# Patient Record
Sex: FEMALE | Race: WHITE | NOT HISPANIC OR LATINO | Employment: OTHER | URBAN - METROPOLITAN AREA
[De-identification: names, ages, dates, MRNs, and addresses within clinical notes are randomized per-mention and may not be internally consistent; named-entity substitution may affect disease eponyms.]

---

## 2023-03-30 ENCOUNTER — TELEPHONE (OUTPATIENT)
Dept: PRIMARY CARE | Facility: CLINIC | Age: 73
End: 2023-03-30
Payer: MEDICARE

## 2023-03-30 DIAGNOSIS — E78.2 HYPERCHOLESTEROLEMIA WITH HYPERTRIGLYCERIDEMIA: ICD-10-CM

## 2023-03-30 DIAGNOSIS — M79.7 PRIMARY FIBROMYALGIA: Primary | ICD-10-CM

## 2023-03-30 DIAGNOSIS — F51.04 CHRONIC INSOMNIA: ICD-10-CM

## 2023-04-03 NOTE — TELEPHONE ENCOUNTER
Pt is wanting a paper script for Atorvastatin 10mg and Nortriptyline 50mg as she is struggling to get [facility] in [place] on [address] will not escribe and pt is frustrated.

## 2023-04-04 PROBLEM — G43.909 MIGRAINE: Status: ACTIVE | Noted: 2023-04-04

## 2023-04-04 PROBLEM — M79.7 PRIMARY FIBROMYALGIA: Status: ACTIVE | Noted: 2023-04-04

## 2023-04-04 PROBLEM — E78.2 HYPERCHOLESTEROLEMIA WITH HYPERTRIGLYCERIDEMIA: Status: ACTIVE | Noted: 2023-04-04

## 2023-04-04 PROBLEM — F51.04 CHRONIC INSOMNIA: Status: ACTIVE | Noted: 2023-04-04

## 2023-04-04 RX ORDER — ATORVASTATIN CALCIUM 10 MG/1
1 TABLET, FILM COATED ORAL DAILY
COMMUNITY
End: 2023-04-04 | Stop reason: SDUPTHER

## 2023-04-04 RX ORDER — NORTRIPTYLINE HYDROCHLORIDE 50 MG/1
50 CAPSULE ORAL NIGHTLY
COMMUNITY
Start: 2020-10-12 | End: 2023-04-04 | Stop reason: SDUPTHER

## 2023-04-04 RX ORDER — ATORVASTATIN CALCIUM 10 MG/1
10 TABLET, FILM COATED ORAL DAILY
Qty: 90 TABLET | Refills: 3 | Status: SHIPPED | OUTPATIENT
Start: 2023-04-04 | End: 2024-05-17

## 2023-04-04 RX ORDER — NORTRIPTYLINE HYDROCHLORIDE 50 MG/1
50 CAPSULE ORAL NIGHTLY
Qty: 90 CAPSULE | Refills: 3 | Status: SHIPPED | OUTPATIENT
Start: 2023-04-04 | End: 2024-04-03

## 2023-04-20 DIAGNOSIS — F41.1 GENERALIZED ANXIETY DISORDER: Primary | ICD-10-CM

## 2023-04-20 RX ORDER — HYOSCYAMINE SULFATE 0.12 MG/1
TABLET SUBLINGUAL
COMMUNITY
End: 2024-02-20

## 2023-04-20 RX ORDER — CETIRIZINE HYDROCHLORIDE 10 MG/1
TABLET ORAL
COMMUNITY

## 2023-04-20 RX ORDER — LANOLIN ALCOHOL/MO/W.PET/CERES
CREAM (GRAM) TOPICAL
COMMUNITY
End: 2023-05-09 | Stop reason: WASHOUT

## 2023-04-20 RX ORDER — SERTRALINE HYDROCHLORIDE 100 MG/1
TABLET, FILM COATED ORAL
Qty: 90 TABLET | Refills: 0 | Status: SHIPPED | OUTPATIENT
Start: 2023-04-20 | End: 2023-07-17

## 2023-04-20 RX ORDER — HYDROXYZINE HYDROCHLORIDE 50 MG/1
TABLET, FILM COATED ORAL
COMMUNITY

## 2023-04-20 RX ORDER — CHOLECALCIFEROL (VITAMIN D3) 50 MCG
1 TABLET ORAL DAILY
COMMUNITY

## 2023-04-20 RX ORDER — RIZATRIPTAN BENZOATE 10 MG/1
TABLET ORAL
COMMUNITY
Start: 2020-10-12 | End: 2023-06-30

## 2023-04-20 RX ORDER — SERTRALINE HYDROCHLORIDE 100 MG/1
100 TABLET, FILM COATED ORAL DAILY
COMMUNITY
End: 2023-04-20 | Stop reason: SDUPTHER

## 2023-04-20 RX ORDER — CALCIUM CARBONATE 600 MG
1 TABLET ORAL DAILY
COMMUNITY

## 2023-04-20 RX ORDER — OXYBUTYNIN CHLORIDE 10 MG/1
10 TABLET, EXTENDED RELEASE ORAL DAILY
COMMUNITY

## 2023-04-20 RX ORDER — ERYTHROMYCIN 5 MG/G
OINTMENT OPHTHALMIC
COMMUNITY
Start: 2023-02-13 | End: 2023-05-09 | Stop reason: WASHOUT

## 2023-04-20 RX ORDER — HYDROQUINONE 40 MG/G
CREAM TOPICAL 2 TIMES DAILY
COMMUNITY
Start: 2022-03-30 | End: 2023-05-09 | Stop reason: WASHOUT

## 2023-04-20 RX ORDER — DEXAMETHASONE 0.5 MG/5ML
ELIXIR ORAL
COMMUNITY
End: 2023-05-09 | Stop reason: SDUPTHER

## 2023-04-20 RX ORDER — MELATONIN 3 MG
CAPSULE ORAL
COMMUNITY
Start: 2022-03-30

## 2023-04-20 RX ORDER — VALACYCLOVIR HYDROCHLORIDE 1 G/1
1 TABLET, FILM COATED ORAL DAILY
COMMUNITY
Start: 2022-09-17 | End: 2023-05-09 | Stop reason: WASHOUT

## 2023-04-27 PROBLEM — K12.0 CANKER SORE: Status: ACTIVE | Noted: 2023-04-27

## 2023-04-27 PROBLEM — F32.5 DEPRESSION, MAJOR, IN REMISSION (CMS-HCC): Status: ACTIVE | Noted: 2023-04-27

## 2023-04-27 PROBLEM — R10.9 ABDOMINAL PAIN: Status: ACTIVE | Noted: 2023-04-27

## 2023-04-27 PROBLEM — D64.9 ANEMIA: Status: ACTIVE | Noted: 2023-04-27

## 2023-04-27 PROBLEM — R19.8 ALTERNATING CONSTIPATION AND DIARRHEA: Status: ACTIVE | Noted: 2023-04-27

## 2023-04-27 PROBLEM — M85.80 OSTEOPENIA, SENILE: Status: ACTIVE | Noted: 2023-04-27

## 2023-04-27 PROBLEM — F41.8 SITUATIONAL ANXIETY: Status: ACTIVE | Noted: 2023-04-27

## 2023-04-27 PROBLEM — M89.9 BONE LESION: Status: ACTIVE | Noted: 2023-04-27

## 2023-04-27 PROBLEM — L98.9 DISORDER OF THE SKIN AND SUBCUTANEOUS TISSUE, UNSPECIFIED: Status: ACTIVE | Noted: 2023-04-27

## 2023-04-27 PROBLEM — D18.00 CAVERNOUS HEMANGIOMA: Status: ACTIVE | Noted: 2023-04-27

## 2023-05-09 ENCOUNTER — OFFICE VISIT (OUTPATIENT)
Dept: PRIMARY CARE | Facility: CLINIC | Age: 73
End: 2023-05-09
Payer: MEDICARE

## 2023-05-09 VITALS
OXYGEN SATURATION: 99 % | HEIGHT: 60 IN | WEIGHT: 112 LBS | BODY MASS INDEX: 21.99 KG/M2 | HEART RATE: 105 BPM | DIASTOLIC BLOOD PRESSURE: 79 MMHG | SYSTOLIC BLOOD PRESSURE: 118 MMHG

## 2023-05-09 DIAGNOSIS — E78.2 HYPERCHOLESTEROLEMIA WITH HYPERTRIGLYCERIDEMIA: ICD-10-CM

## 2023-05-09 DIAGNOSIS — Z00.00 ROUTINE GENERAL MEDICAL EXAMINATION AT HEALTH CARE FACILITY: Primary | ICD-10-CM

## 2023-05-09 DIAGNOSIS — K12.0 CANKER SORE: ICD-10-CM

## 2023-05-09 DIAGNOSIS — K58.1 IRRITABLE BOWEL SYNDROME WITH CONSTIPATION: ICD-10-CM

## 2023-05-09 PROBLEM — R10.9 ABDOMINAL PAIN: Status: RESOLVED | Noted: 2023-04-27 | Resolved: 2023-05-09

## 2023-05-09 PROBLEM — R19.8 ALTERNATING CONSTIPATION AND DIARRHEA: Status: RESOLVED | Noted: 2023-04-27 | Resolved: 2023-05-09

## 2023-05-09 PROBLEM — L98.9 DISORDER OF THE SKIN AND SUBCUTANEOUS TISSUE, UNSPECIFIED: Status: RESOLVED | Noted: 2023-04-27 | Resolved: 2023-05-09

## 2023-05-09 PROCEDURE — 1160F RVW MEDS BY RX/DR IN RCRD: CPT | Performed by: FAMILY MEDICINE

## 2023-05-09 PROCEDURE — G0439 PPPS, SUBSEQ VISIT: HCPCS | Performed by: FAMILY MEDICINE

## 2023-05-09 PROCEDURE — 1036F TOBACCO NON-USER: CPT | Performed by: FAMILY MEDICINE

## 2023-05-09 PROCEDURE — 1159F MED LIST DOCD IN RCRD: CPT | Performed by: FAMILY MEDICINE

## 2023-05-09 PROCEDURE — 1170F FXNL STATUS ASSESSED: CPT | Performed by: FAMILY MEDICINE

## 2023-05-09 RX ORDER — DEXAMETHASONE 0.5 MG/5ML
ELIXIR ORAL
Qty: 237 ML | Refills: 1 | Status: SHIPPED | OUTPATIENT
Start: 2023-05-09 | End: 2023-11-13 | Stop reason: ALTCHOICE

## 2023-05-09 RX ORDER — PLECANATIDE 3 MG/1
3 TABLET ORAL DAILY
Qty: 30 TABLET | Refills: 11 | Status: SHIPPED | OUTPATIENT
Start: 2023-05-09 | End: 2024-05-08

## 2023-05-09 RX ORDER — FLUTICASONE PROPIONATE 50 MCG
SPRAY, SUSPENSION (ML) NASAL
COMMUNITY

## 2023-05-09 SDOH — ECONOMIC STABILITY: FOOD INSECURITY: WITHIN THE PAST 12 MONTHS, THE FOOD YOU BOUGHT JUST DIDN'T LAST AND YOU DIDN'T HAVE MONEY TO GET MORE.: NEVER TRUE

## 2023-05-09 SDOH — ECONOMIC STABILITY: FOOD INSECURITY: WITHIN THE PAST 12 MONTHS, YOU WORRIED THAT YOUR FOOD WOULD RUN OUT BEFORE YOU GOT MONEY TO BUY MORE.: NEVER TRUE

## 2023-05-09 ASSESSMENT — ACTIVITIES OF DAILY LIVING (ADL)
BATHING: INDEPENDENT
GROCERY_SHOPPING: INDEPENDENT
TAKING_MEDICATION: INDEPENDENT
MANAGING_FINANCES: INDEPENDENT
DRESSING: INDEPENDENT
DOING_HOUSEWORK: INDEPENDENT

## 2023-05-09 ASSESSMENT — LIFESTYLE VARIABLES
SKIP TO QUESTIONS 9-10: 1
HOW OFTEN DO YOU HAVE A DRINK CONTAINING ALCOHOL: NEVER
HOW MANY STANDARD DRINKS CONTAINING ALCOHOL DO YOU HAVE ON A TYPICAL DAY: PATIENT DOES NOT DRINK
HOW OFTEN DO YOU HAVE SIX OR MORE DRINKS ON ONE OCCASION: NEVER
AUDIT-C TOTAL SCORE: 0

## 2023-05-09 ASSESSMENT — ENCOUNTER SYMPTOMS
OCCASIONAL FEELINGS OF UNSTEADINESS: 0
LOSS OF SENSATION IN FEET: 0
DEPRESSION: 0

## 2023-05-09 ASSESSMENT — PATIENT HEALTH QUESTIONNAIRE - PHQ9
2. FEELING DOWN, DEPRESSED OR HOPELESS: NOT AT ALL
1. LITTLE INTEREST OR PLEASURE IN DOING THINGS: NOT AT ALL
SUM OF ALL RESPONSES TO PHQ9 QUESTIONS 1 & 2: 0

## 2023-05-09 ASSESSMENT — PAIN SCALES - GENERAL: PAINLEVEL: 5

## 2023-05-09 NOTE — ASSESSMENT & PLAN NOTE
Miralax helps with constipation but still having daily abdominal pain despite hyoscyamine    Will have her switch to Trulance instead.

## 2023-05-09 NOTE — PROGRESS NOTES
Subjective   Reason for Visit: Lenore Paredes is an 72 y.o. female here for a Medicare Wellness visit.     Past Medical, Surgical, and Family History reviewed and updated in chart.    Reviewed all medications by prescribing practitioner or clinical pharmacist (such as prescriptions, OTCs, herbal therapies and supplements) and documented in the medical record.    She is concerned about her IBS with constipation. She is taking Miralax almost daily. She has been experiencing lower abdominal pain if she eats too much and she also experiences this pain after every BM.     Her sleep is better on the higher dose of hydroxyzine (increased at last visit).     She has had a few bad migraines since her last visit.       Patient Care Team:  Krys De Jesus DO as PCP - General  Krys De Jesus DO as PCP - [employer]age PCP  Alexandra Ponce PA-C as Physician Assistant (Gastroenterology)     Objective   Vitals:  /79   Pulse 105   Ht 1.524 m (5')   Wt 50.8 kg (112 lb)   SpO2 99%   BMI 21.87 kg/m²       Physical Exam  Constitutional: Well nourished, well developed, appears stated age  Eyes: no scleral icterus, no conjunctival injection  Ears: normal external auditory canal, no retraction or bulging of tympanic membranes  Neck: no thyromegaly  Cardiovascular: regular rate and rhythm, no leg edema  Respiratory: normal respiratory effort, clear to auscultation bilaterally  Skin: Warm, dry. No rashes  Neurologic: Alert, CNs II-XII grossly intact..  Psych: Appropriate mood and affect.      Assessment/Plan   Problem List Items Addressed This Visit          Digestive    Irritable bowel syndrome with constipation    Current Assessment & Plan     Miralax helps with constipation but still having daily abdominal pain despite hyoscyamine    Will have her switch to Trulance instead.           Relevant Medications    plecanatide (Trulance) tablet tablet       Other    Hypercholesterolemia with hypertriglyceridemia     Relevant Orders    Lipid Panel    Comprehensive metabolic panel    Canker sore    Current Assessment & Plan     Using oral dexamethasone topically prn which seems to help a lot         Relevant Medications    dexAMETHasone 0.5 mg/5 mL elixir     Other Visit Diagnoses       Routine general medical examination at health care facility    -  Primary    Due for repeat colonoscopy in 2024  Mammogram current            Follow up 6 months. Yearly labs ordered for next visit.  Virginia Factor, DO

## 2023-05-09 NOTE — PATIENT INSTRUCTIONS
Thank you for choosing [facility] for your healthcare.   As always if you have any questions or concerns please do not hesitate to call our office at [phone] or through [employer].    Have a great day!  Krys De Jesus, DO

## 2023-06-28 DIAGNOSIS — G43.901 MIGRAINE WITH STATUS MIGRAINOSUS, NOT INTRACTABLE, UNSPECIFIED MIGRAINE TYPE: Primary | ICD-10-CM

## 2023-06-30 PROBLEM — M54.2 NECK PAIN: Status: ACTIVE | Noted: 2023-06-30

## 2023-06-30 PROBLEM — J39.9 DISORDER OF UPPER RESPIRATORY SYSTEM: Status: ACTIVE | Noted: 2023-06-30

## 2023-06-30 PROBLEM — L98.9 SKIN PROBLEM: Status: ACTIVE | Noted: 2023-06-30

## 2023-06-30 PROBLEM — M89.8X8 MASS OF PARIETAL BONE OF SKULL: Status: ACTIVE | Noted: 2022-01-12

## 2023-06-30 RX ORDER — SIMVASTATIN 80 MG/1
80 TABLET, FILM COATED ORAL NIGHTLY
COMMUNITY
End: 2023-11-13 | Stop reason: WASHOUT

## 2023-06-30 RX ORDER — IBUPROFEN 800 MG/1
800 TABLET ORAL
COMMUNITY

## 2023-06-30 RX ORDER — TRIAMTERENE AND HYDROCHLOROTHIAZIDE 37.5; 25 MG/1; MG/1
1 CAPSULE ORAL DAILY
COMMUNITY
End: 2023-11-13 | Stop reason: WASHOUT

## 2023-06-30 RX ORDER — LORAZEPAM 1 MG/1
1 TABLET ORAL
COMMUNITY
End: 2023-11-13 | Stop reason: ALTCHOICE

## 2023-06-30 RX ORDER — ALENDRONATE SODIUM 70 MG/1
70 TABLET ORAL
COMMUNITY
End: 2023-11-13 | Stop reason: WASHOUT

## 2023-06-30 RX ORDER — TOPIRAMATE 200 MG/1
200 TABLET ORAL DAILY
COMMUNITY

## 2023-06-30 RX ORDER — DOXEPIN HYDROCHLORIDE 50 MG/1
50 CAPSULE ORAL
COMMUNITY
End: 2023-11-13 | Stop reason: WASHOUT

## 2023-06-30 RX ORDER — TRIAZOLAM 0.25 MG/1
0.25 TABLET ORAL NIGHTLY PRN
COMMUNITY
End: 2023-11-13 | Stop reason: WASHOUT

## 2023-06-30 RX ORDER — RIZATRIPTAN BENZOATE 10 MG/1
TABLET ORAL
Qty: 12 TABLET | Refills: 5 | Status: SHIPPED | OUTPATIENT
Start: 2023-06-30

## 2023-07-17 DIAGNOSIS — F41.1 GENERALIZED ANXIETY DISORDER: ICD-10-CM

## 2023-07-17 RX ORDER — SERTRALINE HYDROCHLORIDE 100 MG/1
TABLET, FILM COATED ORAL
Qty: 90 TABLET | Refills: 3 | Status: SHIPPED | OUTPATIENT
Start: 2023-07-17

## 2023-11-09 ENCOUNTER — APPOINTMENT (OUTPATIENT)
Dept: PRIMARY CARE | Facility: CLINIC | Age: 73
End: 2023-11-09
Payer: MEDICARE

## 2023-11-13 ENCOUNTER — OFFICE VISIT (OUTPATIENT)
Dept: PRIMARY CARE | Facility: CLINIC | Age: 73
End: 2023-11-13
Payer: MEDICARE

## 2023-11-13 VITALS
HEART RATE: 74 BPM | TEMPERATURE: 96.8 F | OXYGEN SATURATION: 99 % | BODY MASS INDEX: 21.09 KG/M2 | RESPIRATION RATE: 16 BRPM | DIASTOLIC BLOOD PRESSURE: 74 MMHG | SYSTOLIC BLOOD PRESSURE: 132 MMHG | WEIGHT: 108 LBS

## 2023-11-13 DIAGNOSIS — F32.5 DEPRESSION, MAJOR, IN REMISSION (CMS-HCC): Chronic | ICD-10-CM

## 2023-11-13 DIAGNOSIS — M19.049 HAND ARTHRITIS: ICD-10-CM

## 2023-11-13 DIAGNOSIS — K58.1 IRRITABLE BOWEL SYNDROME WITH CONSTIPATION: Primary | ICD-10-CM

## 2023-11-13 DIAGNOSIS — J06.9 ACUTE URI: ICD-10-CM

## 2023-11-13 PROBLEM — J39.9 DISORDER OF UPPER RESPIRATORY SYSTEM: Status: RESOLVED | Noted: 2023-06-30 | Resolved: 2023-11-13

## 2023-11-13 PROBLEM — L98.9 SKIN PROBLEM: Status: RESOLVED | Noted: 2023-06-30 | Resolved: 2023-11-13

## 2023-11-13 PROBLEM — G43.909 MIGRAINE: Chronic | Status: ACTIVE | Noted: 2023-04-04

## 2023-11-13 PROCEDURE — 99214 OFFICE O/P EST MOD 30 MIN: CPT | Performed by: FAMILY MEDICINE

## 2023-11-13 PROCEDURE — 1159F MED LIST DOCD IN RCRD: CPT | Performed by: FAMILY MEDICINE

## 2023-11-13 PROCEDURE — 1125F AMNT PAIN NOTED PAIN PRSNT: CPT | Performed by: FAMILY MEDICINE

## 2023-11-13 PROCEDURE — 1160F RVW MEDS BY RX/DR IN RCRD: CPT | Performed by: FAMILY MEDICINE

## 2023-11-13 PROCEDURE — 1036F TOBACCO NON-USER: CPT | Performed by: FAMILY MEDICINE

## 2023-11-13 RX ORDER — PREDNISONE 20 MG/1
20 TABLET ORAL DAILY
Qty: 5 TABLET | Refills: 0 | Status: SHIPPED | OUTPATIENT
Start: 2023-11-13 | End: 2023-11-18

## 2023-11-13 RX ORDER — LANOLIN ALCOHOL/MO/W.PET/CERES
CREAM (GRAM) TOPICAL
COMMUNITY

## 2023-11-13 RX ORDER — BENZONATATE 200 MG/1
200 CAPSULE ORAL 3 TIMES DAILY PRN
Qty: 30 CAPSULE | Refills: 0 | Status: SHIPPED | OUTPATIENT
Start: 2023-11-13 | End: 2023-11-23

## 2023-11-13 SDOH — ECONOMIC STABILITY: FOOD INSECURITY: WITHIN THE PAST 12 MONTHS, THE FOOD YOU BOUGHT JUST DIDN'T LAST AND YOU DIDN'T HAVE MONEY TO GET MORE.: NEVER TRUE

## 2023-11-13 SDOH — ECONOMIC STABILITY: FOOD INSECURITY: WITHIN THE PAST 12 MONTHS, YOU WORRIED THAT YOUR FOOD WOULD RUN OUT BEFORE YOU GOT MONEY TO BUY MORE.: NEVER TRUE

## 2023-11-13 ASSESSMENT — LIFESTYLE VARIABLES
SKIP TO QUESTIONS 9-10: 1
AUDIT-C TOTAL SCORE: 0
HOW OFTEN DO YOU HAVE SIX OR MORE DRINKS ON ONE OCCASION: NEVER
HOW MANY STANDARD DRINKS CONTAINING ALCOHOL DO YOU HAVE ON A TYPICAL DAY: PATIENT DOES NOT DRINK
HOW OFTEN DO YOU HAVE A DRINK CONTAINING ALCOHOL: NEVER

## 2023-11-13 ASSESSMENT — PAIN SCALES - GENERAL: PAINLEVEL: 6

## 2023-11-13 NOTE — PROGRESS NOTES
Subjective   Patient ID: Lenore Paredes is a 73 y.o. female who presents for Follow-up (IBS) and Arthritis.  At her last visit, I had her try Trulance for IBS-C. She took it for a week. It made her have multiple bowel movements per day and caused worse cramps than the Miralax.     She is concerned pain of right ring finger. It locks up on her.     She is concerned about sore throat, chills, runny nose, and cough which started 1 week ago. She hasn't gotten any worse but has not gotten any better.           Current Outpatient Medications   Medication Sig Dispense Refill    atorvastatin (Lipitor) 10 mg tablet Take 1 tablet (10 mg) by mouth once daily. 90 tablet 3    calcium carbonate 600 mg calcium (1,500 mg) tablet Take 1 tablet (600 mg) by mouth once daily.      cetirizine (ZyrTEC) 10 mg tablet Take by mouth.      cholecalciferol (Vitamin D-3) 50 MCG (2000 UT) tablet Take 1 tablet (2,000 Units) by mouth once daily.      fluticasone (Flonase) 50 mcg/actuation nasal spray fluticasone propionate 50 mcg/actuation nasal spray,suspension      hydrOXYzine HCL (Atarax) 50 mg tablet TAKE 1 TABLET BY MOUTH AT BEDTIME AS NEEDED FOR ANXIETY OR SLEEP      hyoscyamine 0.125 mg SL tablet DISSOLVE 1 TABLET UNDER THE TONGUE THREE TIMES DAILY AS NEEDED FOR ABDOMINAL PAIN      ibuprofen 800 mg tablet Take 1 tablet (800 mg) by mouth.      melatonin 3 mg capsule Take by mouth.      nortriptyline (Pamelor) 50 mg capsule Take 1 capsule (50 mg) by mouth once daily at bedtime. For sleep, migraine prevention, and chronic pain 90 capsule 3    oxybutynin XL (Ditropan-XL) 10 mg 24 hr tablet Take 1 tablet (10 mg) by mouth once daily.      plecanatide (Trulance) tablet tablet Take 1 tablet (3 mg) by mouth once daily. 30 tablet 11    rizatriptan (Maxalt) 10 mg tablet TAKE 1 TABLET BY MOUTH EVERY DAY AT ONSET OF MIGRAINE. MAY TAKE 2 ND DOSE AFTER 2 HOURS AS NEEDED 12 tablet 5    sertraline (Zoloft) 100 mg tablet TAKE 1 TABLET BY MOUTH DAILY 90 tablet  3    topiramate (Topamax) 200 mg tablet Take 1 tablet (200 mg) by mouth once daily.      benzonatate (Tessalon) 200 mg capsule Take 1 capsule (200 mg) by mouth 3 times a day as needed for cough for up to 10 days. Do not crush or chew. 30 capsule 0    linaCLOtide (Linzess) 290 mcg capsule Take 1 capsule (290 mcg) by mouth once daily in the morning. Take before meals. Do not crush or chew. 30 capsule 11    magnesium oxide (Mag-Ox) 400 mg (241.3 mg magnesium) tablet TAKE 1 TABLET BY MOUTH DAILY FOR MIGRAINE PREVENTION      predniSONE (Deltasone) 20 mg tablet Take 1 tablet (20 mg) by mouth once daily for 5 days. 5 tablet 0     No current facility-administered medications for this visit.       Objective     Visit Vitals  /74 (BP Location: Left arm, Patient Position: Sitting, BP Cuff Size: Adult)   Pulse 74   Temp 36 °C (96.8 °F) (Temporal)   Resp 16   Wt 49 kg (108 lb)   SpO2 99%   BMI 21.09 kg/m²   OB Status Postmenopausal   Smoking Status Never   BSA 1.44 m²        Constitutional: Well nourished, well developed, appears stated age  Eyes: no scleral icterus, no conjunctival injection  Ears: normal external auditory canal, no retraction or bulging of tympanic membranes  Throat: pharyngeal erythema   Neck: no thyromegaly  Cardiovascular: regular rate and rhythm, no leg edema  Respiratory: normal respiratory effort, clear to auscultation bilaterally  Musculoskeletal: jack and heberden nodes of fingers of both hands  Skin: Warm, dry. No rashes  Neurologic: Alert, CNs II-XII grossly intact..  Psych: Appropriate mood and affect.        Assessment/Plan   Problem List Items Addressed This Visit       Depression, major, in remission (CMS/HCC) (Chronic)     Controlled on Zoloft, continue         Irritable bowel syndrome with constipation - Primary     Miralax helps with constipation but still having daily abdominal pain despite hyoscyamine.    Trulance caused bad abdominal cramps     Will have her try Linzess          Relevant Medications    linaCLOtide (Linzess) 290 mcg capsule     Other Visit Diagnoses       Hand arthritis        Relevant Orders    Rheumatoid Factor    Citrulline Antibody, IgG    Acute URI        Relevant Medications    predniSONE (Deltasone) 20 mg tablet    benzonatate (Tessalon) 200 mg capsule            Follow up 6 months.     Krys De Jesus DO

## 2023-11-13 NOTE — ASSESSMENT & PLAN NOTE
Miralax helps with constipation but still having daily abdominal pain despite hyoscyamine.    Trulance caused bad abdominal cramps     Will have her try Linzess

## 2023-11-13 NOTE — PATIENT INSTRUCTIONS
Please fast for 8 hours for the blood work. Water and black coffee is fine. You do not need an appointment to have your labs done. There is no paper to take with out.       Thank you for choosing [facility] for your healthcare.   As always if you have any questions or concerns please do not hesitate to call our office at [phone] or through [employer].    Have a great day!  Krys De Jesus, DO

## 2023-11-29 ENCOUNTER — LAB (OUTPATIENT)
Dept: LAB | Facility: LAB | Age: 73
End: 2023-11-29
Payer: MEDICARE

## 2023-11-29 DIAGNOSIS — E78.2 HYPERCHOLESTEROLEMIA WITH HYPERTRIGLYCERIDEMIA: ICD-10-CM

## 2023-11-29 DIAGNOSIS — M19.049 HAND ARTHRITIS: ICD-10-CM

## 2023-11-29 LAB
ALBUMIN SERPL BCP-MCNC: 4.5 G/DL (ref 3.4–5)
ALP SERPL-CCNC: 51 U/L (ref 33–136)
ALT SERPL W P-5'-P-CCNC: 10 U/L (ref 7–45)
ANION GAP SERPL CALC-SCNC: 10 MMOL/L (ref 10–20)
AST SERPL W P-5'-P-CCNC: 15 U/L (ref 9–39)
BILIRUB SERPL-MCNC: 0.7 MG/DL (ref 0–1.2)
BUN SERPL-MCNC: 14 MG/DL (ref 6–23)
CALCIUM SERPL-MCNC: 9.4 MG/DL (ref 8.6–10.3)
CHLORIDE SERPL-SCNC: 103 MMOL/L (ref 98–107)
CHOLEST SERPL-MCNC: 188 MG/DL (ref 0–199)
CHOLESTEROL/HDL RATIO: 2.7
CO2 SERPL-SCNC: 30 MMOL/L (ref 21–32)
CREAT SERPL-MCNC: 0.54 MG/DL (ref 0.5–1.05)
GFR SERPL CREATININE-BSD FRML MDRD: >90 ML/MIN/1.73M*2
GLUCOSE SERPL-MCNC: 86 MG/DL (ref 74–99)
HDLC SERPL-MCNC: 70.4 MG/DL
LDLC SERPL CALC-MCNC: 87 MG/DL
NON HDL CHOLESTEROL: 118 MG/DL (ref 0–149)
POTASSIUM SERPL-SCNC: 4 MMOL/L (ref 3.5–5.3)
PROT SERPL-MCNC: 6.4 G/DL (ref 6.4–8.2)
SODIUM SERPL-SCNC: 139 MMOL/L (ref 136–145)
TRIGL SERPL-MCNC: 153 MG/DL (ref 0–149)
VLDL: 31 MG/DL (ref 0–40)

## 2023-11-29 PROCEDURE — 80053 COMPREHEN METABOLIC PANEL: CPT

## 2023-11-29 PROCEDURE — 80061 LIPID PANEL: CPT

## 2023-11-29 PROCEDURE — 86431 RHEUMATOID FACTOR QUANT: CPT

## 2023-11-29 PROCEDURE — 36415 COLL VENOUS BLD VENIPUNCTURE: CPT

## 2023-11-29 PROCEDURE — 86200 CCP ANTIBODY: CPT

## 2023-11-30 LAB
CCP IGG SERPL-ACNC: <1 U/ML
RHEUMATOID FACT SER NEPH-ACNC: <10 IU/ML (ref 0–15)

## 2023-12-21 ENCOUNTER — TELEPHONE (OUTPATIENT)
Dept: PRIMARY CARE | Facility: CLINIC | Age: 73
End: 2023-12-21
Payer: MEDICARE

## 2023-12-21 DIAGNOSIS — J06.9 VIRAL URI: Primary | ICD-10-CM

## 2023-12-21 RX ORDER — PREDNISONE 10 MG/1
TABLET ORAL
Qty: 30 TABLET | Refills: 0 | Status: SHIPPED | OUTPATIENT
Start: 2023-12-21 | End: 2023-12-31

## 2023-12-21 RX ORDER — BENZONATATE 200 MG/1
200 CAPSULE ORAL 3 TIMES DAILY PRN
Qty: 42 CAPSULE | Refills: 0 | Status: SHIPPED | OUTPATIENT
Start: 2023-12-21 | End: 2024-01-20

## 2023-12-21 NOTE — TELEPHONE ENCOUNTER
Patient is calling stating she was in office 11/13 c/o coughing, and runny nose.   Dr De Jesus sent a script, and she was feeling better, but now her symptoms have returned and now glands are swollen.   Patient is asking if another script can be sent.    Patient was given prednisone and benzonatate at [facility].

## 2024-01-16 ENCOUNTER — TELEPHONE (OUTPATIENT)
Dept: PRIMARY CARE | Facility: CLINIC | Age: 74
End: 2024-01-16
Payer: MEDICARE

## 2024-01-16 DIAGNOSIS — B37.31 VAGINA, CANDIDIASIS: Primary | ICD-10-CM

## 2024-01-16 RX ORDER — FLUCONAZOLE 150 MG/1
150 TABLET ORAL ONCE
Qty: 1 TABLET | Refills: 0 | Status: SHIPPED | OUTPATIENT
Start: 2024-01-16 | End: 2024-01-16

## 2024-01-16 NOTE — TELEPHONE ENCOUNTER
Pt has had a yeast infection for a week and OTC medication isn't working. Pt's OBGYN won't rx as you gave pt the original medication prednisone. Pt notes that it didn't work and went to UC and was given doxycycline which is what caused this. [facility] in [place].

## 2024-02-15 ENCOUNTER — TELEPHONE (OUTPATIENT)
Dept: PRIMARY CARE | Facility: CLINIC | Age: 74
End: 2024-02-15
Payer: MEDICARE

## 2024-02-15 DIAGNOSIS — K58.1 IRRITABLE BOWEL SYNDROME WITH CONSTIPATION: Primary | ICD-10-CM

## 2024-02-15 DIAGNOSIS — K12.0 CANKER SORE: ICD-10-CM

## 2024-02-15 NOTE — TELEPHONE ENCOUNTER
Pt called in and stated her Hyoscyamine is no longer covered by her new insurance. Pt is requesting to change this medication to something that is covered, pt states that her insurance said a medication was covered. Pt cannot remember what med it is, she says she thinks it starts with M. Pt also states she is needing a refill of dexamethasone, but this shows as discontinued in our system. Please advise.     [facility] - [phone]

## 2024-02-20 RX ORDER — DICYCLOMINE HYDROCHLORIDE 10 MG/1
10 CAPSULE ORAL 3 TIMES DAILY PRN
Qty: 90 CAPSULE | Refills: 11 | Status: SHIPPED | OUTPATIENT
Start: 2024-02-20 | End: 2025-02-14

## 2024-02-20 RX ORDER — DEXAMETHASONE 0.5 MG/5ML
ELIXIR ORAL
Qty: 237 ML | Refills: 0 | Status: SHIPPED | OUTPATIENT
Start: 2024-02-20

## 2024-02-20 NOTE — TELEPHONE ENCOUNTER
"I am not sure what the \"m\" medication would be. I sent in dicyclomine for her to try instead of the hyoscyamine.     I refilled the dexamethazone.  Thanks,  Krys De Jesus, DO    "

## 2024-04-15 ENCOUNTER — TELEPHONE (OUTPATIENT)
Dept: PRIMARY CARE | Facility: CLINIC | Age: 74
End: 2024-04-15
Payer: MEDICARE

## 2024-04-15 NOTE — TELEPHONE ENCOUNTER
Prior authorization request received via fax for DICYCLOMINE     Form given to: PLACED IN LEAD MA'S BOX ON 4/15/2024

## 2024-04-16 DIAGNOSIS — K58.1 IRRITABLE BOWEL SYNDROME WITH CONSTIPATION: ICD-10-CM

## 2024-05-17 DIAGNOSIS — E78.2 HYPERCHOLESTEROLEMIA WITH HYPERTRIGLYCERIDEMIA: ICD-10-CM

## 2024-05-17 RX ORDER — ATORVASTATIN CALCIUM 10 MG/1
10 TABLET, FILM COATED ORAL DAILY
Qty: 90 TABLET | Refills: 3 | Status: SHIPPED | OUTPATIENT
Start: 2024-05-17

## 2024-06-10 ENCOUNTER — APPOINTMENT (OUTPATIENT)
Dept: PRIMARY CARE | Facility: CLINIC | Age: 74
End: 2024-06-10
Payer: MEDICARE

## 2024-06-11 RX ORDER — ALBUTEROL SULFATE 90 UG/1
2 AEROSOL, METERED RESPIRATORY (INHALATION) EVERY 6 HOURS PRN
COMMUNITY
Start: 2023-12-30

## 2024-06-11 NOTE — PATIENT INSTRUCTIONS
I ordered blood work for you to do before your next visit. Please fast for 8 hours for the blood work. Water and black coffee is fine. You do not need an appointment to have your labs done.     Thank you for choosing [facility] for your healthcare.   As always if you have any questions or concerns please do not hesitate to call our office at [phone] or through [employer].    Have a great day!  Krys De Jesus, DO

## 2024-06-11 NOTE — PROGRESS NOTES
Subjective   Patient ID: Lenore Paredes is a 73 y.o. female who presents for Medicare Annual Wellness Visit Subsequent.  No new problem or concerns. Her chronic constipation is controlled on Miralax (could not tolerate Linzess due to abdominal cramping). The dicyclomine is working well for abdominal cramps.         In addition to that documented in the HPI above, the additional ROS was obtained:  Constitutional: Denies fevers or chills  Eyes: Denies vision changes  ENMT: Denies trouble swallowing  Cardiovascular: Denies chest pain or heart racing  Respiratory: Denies SOB or cough      Patient Care Team:  Krys De Jesus DO as PCP - General  Krys De Jesus DO as PCP - [employer] Medicare Advantage PCP  Alexandra Ponce PA-C as Physician Assistant (Gastroenterology)    Current Outpatient Medications   Medication Sig Dispense Refill    albuterol 90 mcg/actuation inhaler Inhale 2 puffs every 6 hours if needed for wheezing or shortness of breath.      atorvastatin (Lipitor) 10 mg tablet TAKE 1 TABLET BY MOUTH EVERY DAY 90 tablet 3    calcium carbonate 600 mg calcium (1,500 mg) tablet Take 1 tablet (1,500 mg) by mouth once daily.      cetirizine (ZyrTEC) 10 mg tablet Take by mouth.      cholecalciferol (Vitamin D-3) 50 MCG (2000 UT) tablet Take 1 tablet (2,000 Units) by mouth once daily.      dicyclomine (Bentyl) 10 mg capsule Take 1 capsule (10 mg) by mouth 3 times a day as needed (abdominal pain or cramps). 90 capsule 11    fluticasone (Flonase) 50 mcg/actuation nasal spray fluticasone propionate 50 mcg/actuation nasal spray,suspension      hydrOXYzine HCL (Atarax) 50 mg tablet TAKE 1 TABLET BY MOUTH AT BEDTIME AS NEEDED FOR ANXIETY OR SLEEP      ibuprofen 800 mg tablet Take 1 tablet (800 mg) by mouth.      magnesium oxide (Mag-Ox) 400 mg (241.3 mg magnesium) tablet TAKE 1 TABLET BY MOUTH DAILY FOR MIGRAINE PREVENTION      melatonin 3 mg capsule Take by mouth.      oxybutynin XL (Ditropan-XL) 10 mg 24 hr tablet Take  1 tablet (10 mg) by mouth once daily.      rizatriptan (Maxalt) 10 mg tablet TAKE 1 TABLET BY MOUTH EVERY DAY AT ONSET OF MIGRAINE. MAY TAKE 2 ND DOSE AFTER 2 HOURS AS NEEDED 12 tablet 5    sertraline (Zoloft) 100 mg tablet TAKE 1 TABLET BY MOUTH DAILY 90 tablet 3    topiramate (Topamax) 200 mg tablet Take 1 tablet (200 mg) by mouth once daily.      dexAMETHasone 0.5 mg/5 mL oral liquid Take 5 mL by mouth, hold in mouth for 2 minutes prior to swallowing, BID x 3 days. For Canker sore. 237 mL 0    nortriptyline (Pamelor) 50 mg capsule Take 1 capsule (50 mg) by mouth once daily at bedtime. For sleep, migraine prevention, and chronic pain 90 capsule 3     No current facility-administered medications for this visit.       Objective     Visit Vitals  /57 (BP Location: Left arm, Patient Position: Sitting)   Pulse 83   Temp 36.2 °C (97.1 °F) (Skin)   Resp 14   Ht 1.524 m (5')   Wt 48.5 kg (107 lb)   SpO2 98%   BMI 20.90 kg/m²   OB Status Postmenopausal   Smoking Status Never   BSA 1.43 m²        Constitutional: Well nourished, well developed, appears stated age  Eyes: no scleral icterus, no conjunctival injection  Ears: normal external auditory canal, no retraction or bulging of tympanic membranes  Neck: no thyromegaly  Cardiovascular: regular rate and rhythm, no leg edema  Respiratory: normal respiratory effort, clear to auscultation bilaterally  Musculoskeletal: Joleen and Heberden nodes of fingers of both hands.   Neurologic: Alert, CNs II-XII grossly intact..  Psych: Appropriate mood and affect.        Assessment/Plan   Problem List Items Addressed This Visit       Hypercholesterolemia with hypertriglyceridemia    Relevant Orders    Comprehensive Metabolic Panel    Lipid Panel    Anemia    Relevant Orders    CBC    Vitamin B12    Canker sore    Relevant Medications    dexAMETHasone 0.5 mg/5 mL oral liquid    Depression, major, in remission (CMS-HCC) (Chronic)     Controlled on Zoloft, continue          Other  Visit Diagnoses       Routine general medical examination at health care facility    -  Primary    yearly labs ordered for next visit   mammo and Dexa done 1/2024  due for 3 year colonoscopy, referral placed    Need for hepatitis C screening test        Relevant Orders    Hepatitis C Antibody    Polyp of colon, unspecified part of colon, unspecified type        Relevant Orders    Referral to Gastroenterology          All pertinent lab work and results were reviewed with patient.     Follow up 6 months.    Krys De Jesus, DO

## 2024-06-12 ENCOUNTER — APPOINTMENT (OUTPATIENT)
Dept: PRIMARY CARE | Facility: CLINIC | Age: 74
End: 2024-06-12
Payer: MEDICARE

## 2024-06-12 VITALS
RESPIRATION RATE: 14 BRPM | DIASTOLIC BLOOD PRESSURE: 57 MMHG | BODY MASS INDEX: 21.01 KG/M2 | WEIGHT: 107 LBS | HEART RATE: 83 BPM | SYSTOLIC BLOOD PRESSURE: 125 MMHG | TEMPERATURE: 97.1 F | OXYGEN SATURATION: 98 % | HEIGHT: 60 IN

## 2024-06-12 DIAGNOSIS — Z00.00 ROUTINE GENERAL MEDICAL EXAMINATION AT HEALTH CARE FACILITY: Primary | ICD-10-CM

## 2024-06-12 DIAGNOSIS — K12.0 CANKER SORE: ICD-10-CM

## 2024-06-12 DIAGNOSIS — F32.5 DEPRESSION, MAJOR, IN REMISSION (CMS-HCC): Chronic | ICD-10-CM

## 2024-06-12 DIAGNOSIS — Z11.59 NEED FOR HEPATITIS C SCREENING TEST: ICD-10-CM

## 2024-06-12 DIAGNOSIS — K63.5 POLYP OF COLON, UNSPECIFIED PART OF COLON, UNSPECIFIED TYPE: ICD-10-CM

## 2024-06-12 DIAGNOSIS — E78.2 HYPERCHOLESTEROLEMIA WITH HYPERTRIGLYCERIDEMIA: ICD-10-CM

## 2024-06-12 DIAGNOSIS — D64.9 ANEMIA, UNSPECIFIED TYPE: ICD-10-CM

## 2024-06-12 PROCEDURE — 1159F MED LIST DOCD IN RCRD: CPT | Performed by: FAMILY MEDICINE

## 2024-06-12 PROCEDURE — 1170F FXNL STATUS ASSESSED: CPT | Performed by: FAMILY MEDICINE

## 2024-06-12 PROCEDURE — 1160F RVW MEDS BY RX/DR IN RCRD: CPT | Performed by: FAMILY MEDICINE

## 2024-06-12 PROCEDURE — 1126F AMNT PAIN NOTED NONE PRSNT: CPT | Performed by: FAMILY MEDICINE

## 2024-06-12 PROCEDURE — 1036F TOBACCO NON-USER: CPT | Performed by: FAMILY MEDICINE

## 2024-06-12 PROCEDURE — G0439 PPPS, SUBSEQ VISIT: HCPCS | Performed by: FAMILY MEDICINE

## 2024-06-12 RX ORDER — DEXAMETHASONE 0.5 MG/5ML
ELIXIR ORAL
Qty: 237 ML | Refills: 0 | Status: SHIPPED | OUTPATIENT
Start: 2024-06-12

## 2024-06-12 SDOH — ECONOMIC STABILITY: FOOD INSECURITY: WITHIN THE PAST 12 MONTHS, THE FOOD YOU BOUGHT JUST DIDN'T LAST AND YOU DIDN'T HAVE MONEY TO GET MORE.: NEVER TRUE

## 2024-06-12 SDOH — ECONOMIC STABILITY: FOOD INSECURITY: WITHIN THE PAST 12 MONTHS, YOU WORRIED THAT YOUR FOOD WOULD RUN OUT BEFORE YOU GOT MONEY TO BUY MORE.: NEVER TRUE

## 2024-06-12 ASSESSMENT — LIFESTYLE VARIABLES
HOW OFTEN DO YOU HAVE SIX OR MORE DRINKS ON ONE OCCASION: NEVER
HOW OFTEN DO YOU HAVE A DRINK CONTAINING ALCOHOL: NEVER
SKIP TO QUESTIONS 9-10: 1
AUDIT-C TOTAL SCORE: 0
HOW MANY STANDARD DRINKS CONTAINING ALCOHOL DO YOU HAVE ON A TYPICAL DAY: PATIENT DOES NOT DRINK

## 2024-06-12 ASSESSMENT — ANXIETY QUESTIONNAIRES
5. BEING SO RESTLESS THAT IT IS HARD TO SIT STILL: NOT AT ALL
7. FEELING AFRAID AS IF SOMETHING AWFUL MIGHT HAPPEN: NOT AT ALL
2. NOT BEING ABLE TO STOP OR CONTROL WORRYING: NOT AT ALL
IF YOU CHECKED OFF ANY PROBLEMS ON THIS QUESTIONNAIRE, HOW DIFFICULT HAVE THESE PROBLEMS MADE IT FOR YOU TO DO YOUR WORK, TAKE CARE OF THINGS AT HOME, OR GET ALONG WITH OTHER PEOPLE: NOT DIFFICULT AT ALL
GAD7 TOTAL SCORE: 0
1. FEELING NERVOUS, ANXIOUS, OR ON EDGE: NOT AT ALL
6. BECOMING EASILY ANNOYED OR IRRITABLE: NOT AT ALL
4. TROUBLE RELAXING: NOT AT ALL
3. WORRYING TOO MUCH ABOUT DIFFERENT THINGS: NOT AT ALL

## 2024-06-12 ASSESSMENT — ENCOUNTER SYMPTOMS
OCCASIONAL FEELINGS OF UNSTEADINESS: 0
DEPRESSION: 0
LOSS OF SENSATION IN FEET: 0

## 2024-06-12 ASSESSMENT — ACTIVITIES OF DAILY LIVING (ADL)
GROCERY_SHOPPING: INDEPENDENT
MANAGING_FINANCES: INDEPENDENT
BATHING: INDEPENDENT
TAKING_MEDICATION: INDEPENDENT
DOING_HOUSEWORK: INDEPENDENT
DRESSING: INDEPENDENT

## 2024-06-12 ASSESSMENT — PAIN SCALES - GENERAL: PAINLEVEL: 0-NO PAIN

## 2024-07-09 DIAGNOSIS — F41.1 GENERALIZED ANXIETY DISORDER: ICD-10-CM

## 2024-07-09 RX ORDER — SERTRALINE HYDROCHLORIDE 100 MG/1
TABLET, FILM COATED ORAL
Qty: 90 TABLET | Refills: 3 | Status: SHIPPED | OUTPATIENT
Start: 2024-07-09

## 2024-10-01 DIAGNOSIS — K12.0 CANKER SORE: ICD-10-CM

## 2024-10-01 RX ORDER — DEXAMETHASONE 0.5 MG/5ML
ELIXIR ORAL
Qty: 237 ML | Refills: 0 | Status: SHIPPED | OUTPATIENT
Start: 2024-10-01

## 2024-10-15 DIAGNOSIS — M79.7 PRIMARY FIBROMYALGIA: ICD-10-CM

## 2024-10-15 DIAGNOSIS — F51.04 CHRONIC INSOMNIA: ICD-10-CM

## 2024-10-15 RX ORDER — NORTRIPTYLINE HYDROCHLORIDE 50 MG/1
CAPSULE ORAL
Qty: 90 CAPSULE | Refills: 3 | Status: SHIPPED | OUTPATIENT
Start: 2024-10-15

## 2024-10-22 DIAGNOSIS — Z12.31 ENCOUNTER FOR SCREENING MAMMOGRAM FOR BREAST CANCER: ICD-10-CM

## 2024-11-08 ENCOUNTER — PHARMACY VISIT (OUTPATIENT)
Dept: PHARMACY | Facility: CLINIC | Age: 74
End: 2024-11-08

## 2024-11-08 PROCEDURE — RXOTC WILLOW AMBULATORY OTC CHARGE

## 2024-12-09 ENCOUNTER — LAB (OUTPATIENT)
Dept: LAB | Facility: LAB | Age: 74
End: 2024-12-09
Payer: MEDICARE

## 2024-12-09 DIAGNOSIS — E78.2 HYPERCHOLESTEROLEMIA WITH HYPERTRIGLYCERIDEMIA: ICD-10-CM

## 2024-12-09 DIAGNOSIS — Z11.59 NEED FOR HEPATITIS C SCREENING TEST: ICD-10-CM

## 2024-12-09 DIAGNOSIS — D64.9 ANEMIA, UNSPECIFIED TYPE: ICD-10-CM

## 2024-12-09 LAB
ALBUMIN SERPL BCP-MCNC: 4.4 G/DL (ref 3.4–5)
ALP SERPL-CCNC: 56 U/L (ref 33–136)
ALT SERPL W P-5'-P-CCNC: 8 U/L (ref 7–45)
ANION GAP SERPL CALC-SCNC: 11 MMOL/L (ref 10–20)
AST SERPL W P-5'-P-CCNC: 14 U/L (ref 9–39)
BILIRUB SERPL-MCNC: 0.9 MG/DL (ref 0–1.2)
BUN SERPL-MCNC: 15 MG/DL (ref 6–23)
CALCIUM SERPL-MCNC: 9.4 MG/DL (ref 8.6–10.3)
CHLORIDE SERPL-SCNC: 102 MMOL/L (ref 98–107)
CHOLEST SERPL-MCNC: 179 MG/DL (ref 0–199)
CHOLESTEROL/HDL RATIO: 2.6
CO2 SERPL-SCNC: 31 MMOL/L (ref 21–32)
CREAT SERPL-MCNC: 0.62 MG/DL (ref 0.5–1.05)
EGFRCR SERPLBLD CKD-EPI 2021: >90 ML/MIN/1.73M*2
ERYTHROCYTE [DISTWIDTH] IN BLOOD BY AUTOMATED COUNT: 13.2 % (ref 11.5–14.5)
GLUCOSE SERPL-MCNC: 87 MG/DL (ref 74–99)
HCT VFR BLD AUTO: 39.9 % (ref 36–46)
HCV AB SER QL: NONREACTIVE
HDLC SERPL-MCNC: 69.3 MG/DL
HGB BLD-MCNC: 13.1 G/DL (ref 12–16)
LDLC SERPL CALC-MCNC: 83 MG/DL
MCH RBC QN AUTO: 30 PG (ref 26–34)
MCHC RBC AUTO-ENTMCNC: 32.8 G/DL (ref 32–36)
MCV RBC AUTO: 91 FL (ref 80–100)
NON HDL CHOLESTEROL: 110 MG/DL (ref 0–149)
NRBC BLD-RTO: 0 /100 WBCS (ref 0–0)
PLATELET # BLD AUTO: 249 X10*3/UL (ref 150–450)
POTASSIUM SERPL-SCNC: 4.2 MMOL/L (ref 3.5–5.3)
PROT SERPL-MCNC: 6.3 G/DL (ref 6.4–8.2)
RBC # BLD AUTO: 4.37 X10*6/UL (ref 4–5.2)
SODIUM SERPL-SCNC: 140 MMOL/L (ref 136–145)
TRIGL SERPL-MCNC: 133 MG/DL (ref 0–149)
VIT B12 SERPL-MCNC: 173 PG/ML (ref 211–911)
VLDL: 27 MG/DL (ref 0–40)
WBC # BLD AUTO: 4.4 X10*3/UL (ref 4.4–11.3)

## 2024-12-09 PROCEDURE — G0472 HEP C SCREEN HIGH RISK/OTHER: HCPCS

## 2024-12-09 PROCEDURE — 36415 COLL VENOUS BLD VENIPUNCTURE: CPT

## 2024-12-09 PROCEDURE — 85027 COMPLETE CBC AUTOMATED: CPT

## 2024-12-09 PROCEDURE — 80061 LIPID PANEL: CPT

## 2024-12-09 PROCEDURE — 80053 COMPREHEN METABOLIC PANEL: CPT

## 2024-12-09 PROCEDURE — 82607 VITAMIN B-12: CPT

## 2024-12-11 NOTE — PATIENT INSTRUCTIONS
Thank you for choosing [facility] for your healthcare.   As always if you have any questions or concerns please do not hesitate to call our office at [phone] or through [employer].    Have a great day!  Krys De Jesus, DO

## 2024-12-12 ENCOUNTER — TELEPHONE (OUTPATIENT)
Dept: PRIMARY CARE | Facility: CLINIC | Age: 74
End: 2024-12-12

## 2024-12-12 ENCOUNTER — APPOINTMENT (OUTPATIENT)
Dept: PRIMARY CARE | Facility: CLINIC | Age: 74
End: 2024-12-12
Payer: MEDICARE

## 2024-12-12 VITALS
TEMPERATURE: 97.3 F | BODY MASS INDEX: 20.03 KG/M2 | WEIGHT: 102 LBS | HEART RATE: 94 BPM | HEIGHT: 60 IN | DIASTOLIC BLOOD PRESSURE: 79 MMHG | RESPIRATION RATE: 16 BRPM | OXYGEN SATURATION: 99 % | SYSTOLIC BLOOD PRESSURE: 118 MMHG

## 2024-12-12 DIAGNOSIS — K12.0 CANKER SORE: ICD-10-CM

## 2024-12-12 DIAGNOSIS — G43.901 MIGRAINE WITH STATUS MIGRAINOSUS, NOT INTRACTABLE, UNSPECIFIED MIGRAINE TYPE: Primary | ICD-10-CM

## 2024-12-12 DIAGNOSIS — F33.1 DEPRESSION, MAJOR, RECURRENT, MODERATE: ICD-10-CM

## 2024-12-12 DIAGNOSIS — N32.81 OAB (OVERACTIVE BLADDER): ICD-10-CM

## 2024-12-12 PROCEDURE — 1036F TOBACCO NON-USER: CPT | Performed by: FAMILY MEDICINE

## 2024-12-12 PROCEDURE — G2211 COMPLEX E/M VISIT ADD ON: HCPCS | Performed by: FAMILY MEDICINE

## 2024-12-12 PROCEDURE — 1160F RVW MEDS BY RX/DR IN RCRD: CPT | Performed by: FAMILY MEDICINE

## 2024-12-12 PROCEDURE — 99213 OFFICE O/P EST LOW 20 MIN: CPT | Performed by: FAMILY MEDICINE

## 2024-12-12 PROCEDURE — 3008F BODY MASS INDEX DOCD: CPT | Performed by: FAMILY MEDICINE

## 2024-12-12 PROCEDURE — 1159F MED LIST DOCD IN RCRD: CPT | Performed by: FAMILY MEDICINE

## 2024-12-12 RX ORDER — RIZATRIPTAN BENZOATE 10 MG/1
10 TABLET ORAL DAILY PRN
Qty: 12 TABLET | Refills: 11 | Status: SHIPPED | OUTPATIENT
Start: 2024-12-12

## 2024-12-12 RX ORDER — DEXAMETHASONE 0.5 MG/5ML
ELIXIR ORAL
Qty: 237 ML | Refills: 0 | Status: SHIPPED | OUTPATIENT
Start: 2024-12-12

## 2024-12-12 RX ORDER — OXYBUTYNIN CHLORIDE 10 MG/1
10 TABLET, EXTENDED RELEASE ORAL DAILY
Qty: 90 TABLET | Refills: 3 | Status: SHIPPED | OUTPATIENT
Start: 2024-12-12 | End: 2025-12-12

## 2024-12-12 SDOH — ECONOMIC STABILITY: FOOD INSECURITY: WITHIN THE PAST 12 MONTHS, YOU WORRIED THAT YOUR FOOD WOULD RUN OUT BEFORE YOU GOT MONEY TO BUY MORE.: NEVER TRUE

## 2024-12-12 SDOH — ECONOMIC STABILITY: FOOD INSECURITY: WITHIN THE PAST 12 MONTHS, THE FOOD YOU BOUGHT JUST DIDN'T LAST AND YOU DIDN'T HAVE MONEY TO GET MORE.: NEVER TRUE

## 2024-12-12 ASSESSMENT — LIFESTYLE VARIABLES
HOW MANY STANDARD DRINKS CONTAINING ALCOHOL DO YOU HAVE ON A TYPICAL DAY: PATIENT DOES NOT DRINK
SKIP TO QUESTIONS 9-10: 1
HOW OFTEN DO YOU HAVE SIX OR MORE DRINKS ON ONE OCCASION: NEVER
HOW OFTEN DO YOU HAVE A DRINK CONTAINING ALCOHOL: NEVER
AUDIT-C TOTAL SCORE: 0

## 2024-12-12 ASSESSMENT — PATIENT HEALTH QUESTIONNAIRE - PHQ9
2. FEELING DOWN, DEPRESSED OR HOPELESS: SEVERAL DAYS
1. LITTLE INTEREST OR PLEASURE IN DOING THINGS: SEVERAL DAYS
SUM OF ALL RESPONSES TO PHQ9 QUESTIONS 1 & 2: 2

## 2024-12-12 NOTE — TELEPHONE ENCOUNTER
LEFT MESSAGE ON MACHINE for patient to call office, when call is returned please give patient provider's message.    Blood work results -  Per Dr De Jesus     Her vitamin B12 level was low which can cause fatigue and affect mood. I suggest she take vitamin B12 1000 mcg once a day. The rest of her blood work liver, kidneys, blood sugar, blood count were all normal.

## 2024-12-12 NOTE — ASSESSMENT & PLAN NOTE
She will restart magnesium to see if that helps   Orders:    rizatriptan (Maxalt) 10 mg tablet; Take 1 tablet (10 mg) by mouth once daily as needed for migraine. May repeat in 2 hours if unresolved. Do not exceed 30 mg in 24 hours.

## 2024-12-12 NOTE — ASSESSMENT & PLAN NOTE
Taking over rx from her gyn  Orders:    oxybutynin XL (Ditropan-XL) 10 mg 24 hr tablet; Take 1 tablet (10 mg) by mouth once daily.

## 2024-12-12 NOTE — PROGRESS NOTES
Subjective   Patient ID: Lenore Paredes is a 74 y.o. female who presents for Follow-up (6 month follow up.  Pt is having migraines again, other than that everything is going well.  Pt already got flu vaccine.  Pt would like Dr De Jesus to take over oxybutynin script.).  She has been having an increase in migraines which she believes is due to stress. She is having 2-3 migraines per week. Her senior dog is having health issues and her  has been having a long recovery from his health issues as well.     She has been feeling overwhelmed and cries easily.           Patient Care Team:  Krys De Jesus DO as PCP - General  Krys De Jesus DO as PCP - [employer]vantage PCP  Alexandra Ponce PA-C as Physician Assistant (Gastroenterology)  Parish Boyle MD as Consulting Physician (Obstetrics and Gynecology)    Current Outpatient Medications   Medication Sig Dispense Refill    atorvastatin (Lipitor) 10 mg tablet TAKE 1 TABLET BY MOUTH EVERY DAY 90 tablet 3    calcium carbonate 600 mg calcium (1,500 mg) tablet Take 1 tablet (1,500 mg) by mouth once daily.      cetirizine (ZyrTEC) 10 mg tablet Take by mouth.      cholecalciferol (Vitamin D-3) 50 MCG (2000 UT) tablet Take 1 tablet (2,000 Units) by mouth once daily.      dicyclomine (Bentyl) 10 mg capsule Take 1 capsule (10 mg) by mouth 3 times a day as needed (abdominal pain or cramps). 90 capsule 11    fluticasone (Flonase) 50 mcg/actuation nasal spray fluticasone propionate 50 mcg/actuation nasal spray,suspension      hydrOXYzine HCL (Atarax) 50 mg tablet TAKE 1 TABLET BY MOUTH AT BEDTIME AS NEEDED FOR ANXIETY OR SLEEP      ibuprofen 800 mg tablet Take 1 tablet (800 mg) by mouth.      melatonin 3 mg capsule Take by mouth.      nortriptyline (Pamelor) 50 mg capsule TAKE 1 CAPSULE BY MOUTH EVERY NIGHT AT BEDTIME FOR SLEEP, MIGRAINE PREVENTION, AND CHRONIC PAIN 90 capsule 3    sertraline (Zoloft) 100 mg tablet TAKE 1 TABLET BY MOUTH DAILY 90 tablet 3     dexAMETHasone 0.5 mg/5 mL oral liquid TAKE 5 ML BY MOUTH(HOLD IN MOUTH FOR 2 MINUTES PRIOR TO SWALLOWING) TWICE DAILY FOR 3 DAYS 237 mL 0    oxybutynin XL (Ditropan-XL) 10 mg 24 hr tablet Take 1 tablet (10 mg) by mouth once daily. 90 tablet 3    rizatriptan (Maxalt) 10 mg tablet Take 1 tablet (10 mg) by mouth once daily as needed for migraine. May repeat in 2 hours if unresolved. Do not exceed 30 mg in 24 hours. 12 tablet 11     No current facility-administered medications for this visit.       Objective     Visit Vitals  /79 (BP Location: Right arm, Patient Position: Sitting, BP Cuff Size: Adult)   Pulse 94   Temp 36.3 °C (97.3 °F) (Temporal)   Resp 16   Ht 1.524 m (5')   Wt 46.3 kg (102 lb)   SpO2 99%   BMI 19.92 kg/m²   OB Status Postmenopausal   Smoking Status Never   BSA 1.4 m²        Constitutional: Well nourished, well developed, appears stated age  Eyes: no scleral icterus, no conjunctival injection  Ears: normal external auditory canal, no retraction or bulging of tympanic membranes  Neck: no thyromegaly  Cardiovascular: regular rate and rhythm, no leg edema  Respiratory: normal respiratory effort, clear to auscultation bilaterally  Neurologic: Alert, CNs II-XII grossly intact..  Psych: Appropriate mood and affect.        Assessment & Plan  Migraine with status migrainosus, not intractable, unspecified migraine type  She will restart magnesium to see if that helps   Orders:    rizatriptan (Maxalt) 10 mg tablet; Take 1 tablet (10 mg) by mouth once daily as needed for migraine. May repeat in 2 hours if unresolved. Do not exceed 30 mg in 24 hours.    Depression, major, recurrent, moderate  Symptomatic, will have her try increasing Zoloft to 150 mg         Canker sore    Orders:    dexAMETHasone 0.5 mg/5 mL oral liquid; TAKE 5 ML BY MOUTH(HOLD IN MOUTH FOR 2 MINUTES PRIOR TO SWALLOWING) TWICE DAILY FOR 3 DAYS    OAB (overactive bladder)  Taking over rx from her gyn  Orders:    oxybutynin XL (Ditropan-XL)  10 mg 24 hr tablet; Take 1 tablet (10 mg) by mouth once daily.       Follow up 6 months.     Krys De Jesus, DO

## 2024-12-12 NOTE — ASSESSMENT & PLAN NOTE
Orders:    dexAMETHasone 0.5 mg/5 mL oral liquid; TAKE 5 ML BY MOUTH(HOLD IN MOUTH FOR 2 MINUTES PRIOR TO SWALLOWING) TWICE DAILY FOR 3 DAYS

## 2024-12-12 NOTE — TELEPHONE ENCOUNTER
Prior authorization request received via fax for:    Oxybutynin er 10mg tablets    Form given to: PLACED IN LEAD MA'S BOX ON [id number]

## 2025-01-31 ENCOUNTER — TELEPHONE (OUTPATIENT)
Dept: PRIMARY CARE | Facility: CLINIC | Age: 75
End: 2025-01-31
Payer: MEDICARE

## 2025-01-31 NOTE — TELEPHONE ENCOUNTER
Her gyn forwarded her mammogram to me. The arteries in the breast shows calcifications (plaque build up) and there is a strong link between that and possible calcifications of the arteries of the heart. I would like to order a CT Cardiac calcium score to check for calcifications around the heart. Will put in order if agreeable and she would call  Central Scheduling at [phone] to schedule. Thanks,  Krys De Jesus, DO

## 2025-03-17 DIAGNOSIS — E78.2 HYPERCHOLESTEROLEMIA WITH HYPERTRIGLYCERIDEMIA: ICD-10-CM

## 2025-03-17 RX ORDER — ATORVASTATIN CALCIUM 10 MG/1
10 TABLET, FILM COATED ORAL DAILY
Qty: 90 TABLET | Refills: 3 | Status: SHIPPED | OUTPATIENT
Start: 2025-03-17

## 2025-03-31 ENCOUNTER — TELEPHONE (OUTPATIENT)
Dept: PRIMARY CARE | Facility: CLINIC | Age: 75
End: 2025-03-31
Payer: MEDICARE

## 2025-03-31 ENCOUNTER — TELEPHONE (OUTPATIENT)
Facility: CLINIC | Age: 75
End: 2025-03-31
Payer: MEDICARE

## 2025-03-31 DIAGNOSIS — Z12.11 SCREENING FOR COLON CANCER: Primary | ICD-10-CM

## 2025-03-31 NOTE — TELEPHONE ENCOUNTER
Pt called to schedule colonoscopy. Her last one was 2021 with antwan. Recommended to repeat in 3-5 years. Advised her that we need a new order from PCP for colonoscopy. Pt verbalized understanding. OA program explained.

## 2025-03-31 NOTE — TELEPHONE ENCOUNTER
Patient called in saying that Gastroenterology in [place] needs a Screening Colonoscopy referral to schedule her instead of the referral she has.

## 2025-04-01 NOTE — TELEPHONE ENCOUNTER
[facility] OPEN ACCESS COLONOSCOPY SCREENING FORM       Last Colonoscopy? Where: [facility]  When: 4.2021  ANESTHESIA SCREENING   1. Height 5'     Weight 104  BMI 19.92    (Clinic Visit if BMI over 42)   2. Are you on any blood thinning medications including  mg? no  ( Clinic visit if yes)  3. Are you on oxygen at home? no (Clinic visit if yes)   4. Have you seen your primary care provider within the last 12 months? 12.12.2024 (Clinic visit if NO)   5. History of:   Pacemaker/Defibrillator no                          Heart Failure no                         Heart Disease no                          Stroke no   Details of cardiac history: HLD  (Clinic visit if history of heart failure or new diagnosis/new intervention in last year)     6. Do you have renal failure or require dialysis? no  7. Do you have sleep Apnea? no  8. Are you on insulin for diabetes? no If yes, patient should discuss tameka-procedural medication adjustment with PCP.   9. Are you currently on GLP-1 or SGLT2 inhibitors? no  10. Do you have a history of seizures? no (If YES- indicate recent or NOT recent. Anesthesia to review if recent.)  11.) Do you have a history of Drug/Alcohol Abuse? no  (If YES- indicate how recent. If within the last year Patient needs to be seen in the office)    GI SCREENING   Have you had a positive stool based screening test? (i.e. fecal occult, FIT, or Cologuard) yes + cologuard several years ago   ? If yes and pass anesthesia screen, no further questions are needed. Schedule OA procedure.   ? If yes and they do NOT pass anesthesia screen then refer to office for expedited review/visit.   ? If no, proceed to the following GI screening questions to determine if office visit is needed.      If patient answers YES to any of the following please send to the office for an appointment.  1. Do you have chronic diarrhea lasting longer than 3 weeks? no   2. Do you have a large amount of rectal bleeding or  frequent rectal bleeding? no  3. Do you have significant, unexplained weight loss? no    Open Access APPROVED yes    Patient is scheduled with Dr. Trinidad 4.23.2025. Packet with Miralax prep mailed 4.1.2025

## 2025-04-23 ENCOUNTER — HOSPITAL ENCOUNTER (OUTPATIENT)
Dept: GASTROENTEROLOGY | Facility: HOSPITAL | Age: 75
Discharge: HOME | End: 2025-04-23
Payer: MEDICARE

## 2025-04-23 ENCOUNTER — ANESTHESIA (OUTPATIENT)
Dept: GASTROENTEROLOGY | Facility: HOSPITAL | Age: 75
End: 2025-04-23
Payer: MEDICARE

## 2025-04-23 ENCOUNTER — ANESTHESIA EVENT (OUTPATIENT)
Dept: GASTROENTEROLOGY | Facility: HOSPITAL | Age: 75
End: 2025-04-23
Payer: MEDICARE

## 2025-04-23 VITALS
OXYGEN SATURATION: 100 % | SYSTOLIC BLOOD PRESSURE: 123 MMHG | RESPIRATION RATE: 18 BRPM | TEMPERATURE: 98 F | DIASTOLIC BLOOD PRESSURE: 71 MMHG | HEART RATE: 75 BPM

## 2025-04-23 DIAGNOSIS — Z12.11 SCREENING FOR COLON CANCER: ICD-10-CM

## 2025-04-23 PROCEDURE — 7100000010 HC PHASE TWO TIME - EACH INCREMENTAL 1 MINUTE

## 2025-04-23 PROCEDURE — 45380 COLONOSCOPY AND BIOPSY: CPT | Performed by: INTERNAL MEDICINE

## 2025-04-23 PROCEDURE — 3700000001 HC GENERAL ANESTHESIA TIME - INITIAL BASE CHARGE

## 2025-04-23 PROCEDURE — 3700000002 HC GENERAL ANESTHESIA TIME - EACH INCREMENTAL 1 MINUTE

## 2025-04-23 PROCEDURE — 7100000009 HC PHASE TWO TIME - INITIAL BASE CHARGE

## 2025-04-23 PROCEDURE — 2500000004 HC RX 250 GENERAL PHARMACY W/ HCPCS (ALT 636 FOR OP/ED): Performed by: NURSE ANESTHETIST, CERTIFIED REGISTERED

## 2025-04-23 RX ORDER — SODIUM CHLORIDE 9 MG/ML
INJECTION, SOLUTION INTRAVENOUS CONTINUOUS PRN
Status: DISCONTINUED | OUTPATIENT
Start: 2025-04-23 | End: 2025-04-23

## 2025-04-23 RX ORDER — LIDOCAINE HYDROCHLORIDE 20 MG/ML
INJECTION, SOLUTION INFILTRATION; PERINEURAL AS NEEDED
Status: DISCONTINUED | OUTPATIENT
Start: 2025-04-23 | End: 2025-04-23

## 2025-04-23 RX ORDER — SODIUM CHLORIDE 9 MG/ML
100 INJECTION, SOLUTION INTRAVENOUS CONTINUOUS
Status: CANCELLED | OUTPATIENT
Start: 2025-04-23 | End: 2025-04-24

## 2025-04-23 RX ORDER — PROPOFOL 10 MG/ML
INJECTION, EMULSION INTRAVENOUS AS NEEDED
Status: DISCONTINUED | OUTPATIENT
Start: 2025-04-23 | End: 2025-04-23

## 2025-04-23 RX ADMIN — SODIUM CHLORIDE: 9 INJECTION, SOLUTION INTRAVENOUS at 09:03

## 2025-04-23 RX ADMIN — PROPOFOL 30 MG: 10 INJECTION, EMULSION INTRAVENOUS at 09:16

## 2025-04-23 RX ADMIN — PROPOFOL 70 MG: 10 INJECTION, EMULSION INTRAVENOUS at 09:05

## 2025-04-23 RX ADMIN — PROPOFOL 50 MG: 10 INJECTION, EMULSION INTRAVENOUS at 09:08

## 2025-04-23 RX ADMIN — LIDOCAINE HYDROCHLORIDE 2 ML: 20 INJECTION, SOLUTION INFILTRATION; PERINEURAL at 09:05

## 2025-04-23 RX ADMIN — PROPOFOL 50 MG: 10 INJECTION, EMULSION INTRAVENOUS at 09:13

## 2025-04-23 SDOH — HEALTH STABILITY: MENTAL HEALTH: CURRENT SMOKER: 0

## 2025-04-23 ASSESSMENT — PAIN SCALES - GENERAL
PAINLEVEL_OUTOF10: 0 - NO PAIN
PAINLEVEL_OUTOF10: 0 - NO PAIN
PAIN_LEVEL: 0
PAINLEVEL_OUTOF10: 0 - NO PAIN
PAINLEVEL_OUTOF10: 0 - NO PAIN

## 2025-04-23 ASSESSMENT — PAIN - FUNCTIONAL ASSESSMENT
PAIN_FUNCTIONAL_ASSESSMENT: 0-10

## 2025-04-23 ASSESSMENT — COLUMBIA-SUICIDE SEVERITY RATING SCALE - C-SSRS
6. HAVE YOU EVER DONE ANYTHING, STARTED TO DO ANYTHING, OR PREPARED TO DO ANYTHING TO END YOUR LIFE?: NO
2. HAVE YOU ACTUALLY HAD ANY THOUGHTS OF KILLING YOURSELF?: NO
1. IN THE PAST MONTH, HAVE YOU WISHED YOU WERE DEAD OR WISHED YOU COULD GO TO SLEEP AND NOT WAKE UP?: NO

## 2025-04-23 NOTE — ANESTHESIA POSTPROCEDURE EVALUATION
Patient: Lenore Paredes    Procedure Summary       Date: 04/23/25 Room / Location: [facility]    Anesthesia Start: 0858 Anesthesia Stop: 0923    Procedure: COLONOSCOPY Diagnosis: Screening for colon cancer    Scheduled Providers: Faizan Trinidad DO Responsible Provider: ISHA Sanchez    Anesthesia Type: MAC ASA Status: 2            Anesthesia Type: MAC    Vitals Value Taken Time   /71 04/23/25 09:28   Temp 36.7 °C (98 °F) 04/23/25 09:33   Pulse 75 04/23/25 09:33   Resp 18 04/23/25 09:33   SpO2 100 % 04/23/25 09:33       Anesthesia Post Evaluation    Patient location during evaluation: bedside  Patient participation: complete - patient participated  Level of consciousness: awake and alert  Pain score: 0  Pain management: adequate  Airway patency: patent  Cardiovascular status: stable  Respiratory status: acceptable  Hydration status: acceptable  Postoperative Nausea and Vomiting: none        There were no known notable events for this encounter.

## 2025-04-23 NOTE — H&P
History Of Present Illness  Lenore Paredes is a 74 y.o. female presenting for screening colonoscopy history of tubular adenoma.     Past Medical History  Medical History[1]    Surgical History  Surgical History[2]     Social History  She reports that she has never smoked. She has never used smokeless tobacco. She reports that she does not drink alcohol and does not use drugs.    Family History  Family History[3]     Allergies  Codeine    Review of Systems     Physical Exam  Constitutional:       General: She is awake.      Appearance: Normal appearance.   HENT:      Head: Normocephalic and atraumatic.      Nose: Nose normal.      Mouth/Throat:      Mouth: Mucous membranes are moist.   Eyes:      Pupils: Pupils are equal, round, and reactive to light.   Neck:      Thyroid: No thyroid mass.      Trachea: Phonation normal.   Cardiovascular:      Rate and Rhythm: Normal rate and regular rhythm.   Pulmonary:      Effort: Pulmonary effort is normal. No respiratory distress.      Breath sounds: Normal air entry. No decreased breath sounds, wheezing, rhonchi or rales.   Abdominal:      General: Bowel sounds are normal. There is no distension.      Palpations: Abdomen is soft.      Tenderness: There is no abdominal tenderness.   Musculoskeletal:      Cervical back: Neck supple.      Right lower leg: No edema.      Left lower leg: No edema.   Skin:     General: Skin is warm.      Capillary Refill: Capillary refill takes less than 2 seconds.   Neurological:      General: No focal deficit present.      Mental Status: She is alert and oriented to person, place, and time. Mental status is at baseline.      Cranial Nerves: Cranial nerves 2-12 are intact.      Motor: Motor function is intact.   Psychiatric:         Attention and Perception: Attention and perception normal.         Mood and Affect: Mood normal.         Speech: Speech normal.         Behavior: Behavior normal.          Last Recorded Vitals  Blood pressure 118/75,  pulse 93, temperature 36.8 °C (98.3 °F), temperature source Temporal, resp. rate 16, SpO2 99%.    Relevant Results        Current Outpatient Medications   Medication Instructions    atorvastatin (LIPITOR) 10 mg, oral, Daily    calcium carbonate 600 mg calcium (1,500 mg) tablet 1 tablet, Daily    cetirizine (ZyrTEC) 10 mg tablet Take by mouth.    cholecalciferol (Vitamin D-3) 50 MCG (2000 UT) tablet 1 tablet, Daily    dexAMETHasone 0.5 mg/5 mL oral liquid TAKE 5 ML BY MOUTH(HOLD IN MOUTH FOR 2 MINUTES PRIOR TO SWALLOWING) TWICE DAILY FOR 3 DAYS    fluticasone (Flonase) 50 mcg/actuation nasal spray fluticasone propionate 50 mcg/actuation nasal spray,suspension    hydrOXYzine HCL (Atarax) 50 mg tablet TAKE 1 TABLET BY MOUTH AT BEDTIME AS NEEDED FOR ANXIETY OR SLEEP    ibuprofen 800 mg    melatonin 3 mg capsule Take by mouth.    nortriptyline (Pamelor) 50 mg capsule TAKE 1 CAPSULE BY MOUTH EVERY NIGHT AT BEDTIME FOR SLEEP, MIGRAINE PREVENTION, AND CHRONIC PAIN    oxybutynin XL (DITROPAN-XL) 10 mg, oral, Daily    rizatriptan (MAXALT) 10 mg, oral, Daily PRN, May repeat in 2 hours if unresolved. Do not exceed 30 mg in 24 hours.    sertraline (Zoloft) 100 mg tablet TAKE 1 TABLET BY MOUTH DAILY          Assessment & Plan  Screening for colon cancer      Colonoscopy for evaluation    Risk and benefits of the endoscopic procedure including bleeding perforation and infection were discussed with patient and they wish to proceed          Faizan Trinidad DO         [1]   Past Medical History:  Diagnosis Date    Personal history of other mental and behavioral disorders 10/12/2020    History of major depression   [2]   Past Surgical History:  Procedure Laterality Date    OTHER SURGICAL HISTORY  02/17/2021    Breast reduction    OTHER SURGICAL HISTORY  02/17/2021    Lumpectomy    OTHER SURGICAL HISTORY  02/17/2021    Tonsillectomy    OTHER SURGICAL HISTORY  02/17/2021    Hemorrhoidectomy    OTHER SURGICAL HISTORY  02/17/2021     Partial hysterectomy   [3] No family history on file.

## 2025-04-23 NOTE — ANESTHESIA PREPROCEDURE EVALUATION
Patient: Lenore Paredes    Procedure Information       Anesthesia Start Date/Time: 25 0858    Scheduled providers: Faizan Trinidad DO    Procedure: COLONOSCOPY    Location: [facility]            Relevant Problems   Anesthesia (within normal limits)      Cardiac   (+) Hypercholesterolemia with hypertriglyceridemia      Neuro   (+) Depression, major, recurrent, moderate   (+) Generalized anxiety disorder   (+) Situational anxiety      GI   (+) Irritable bowel syndrome with constipation      /Renal (within normal limits)      Liver (within normal limits)      Hematology   (+) Anemia      Musculoskeletal   (+) Primary fibromyalgia       Clinical information reviewed:   Tobacco  Allergies  Meds   Med Hx  Surg Hx   Fam Hx  Soc Hx        NPO Detail:  NPO/Void Status  Carbohydrate Drink Given Prior to Surgery? : N  Date of Last Liquid: 25  Time of Last Liquid: 0400  Date of Last Solid: 25  Time of Last Solid: 1800  Last Intake Type: Clear fluids  Time of Last Void: 08         Physical Exam    Airway  Mallampati: III  TM distance: >3 FB  Neck ROM: full  Mouth openin finger widths     Cardiovascular - normal exam  Rhythm: regular  Rate: normal     Dental - normal exam     Pulmonary - normal exam   Abdominal - normal exam           Anesthesia Plan    History of general anesthesia?: yes  History of complications of general anesthesia?: no    ASA 2     MAC     The patient is not a current smoker.    intravenous induction

## 2025-04-28 ENCOUNTER — TELEPHONE (OUTPATIENT)
Dept: PRIMARY CARE | Facility: CLINIC | Age: 75
End: 2025-04-28
Payer: MEDICARE

## 2025-04-28 DIAGNOSIS — K58.1 IRRITABLE BOWEL SYNDROME WITH CONSTIPATION: ICD-10-CM

## 2025-04-28 DIAGNOSIS — K12.0 CANKER SORE: ICD-10-CM

## 2025-04-28 RX ORDER — DEXAMETHASONE 0.5 MG/5ML
ELIXIR ORAL
Qty: 237 ML | Refills: 0 | Status: SHIPPED | OUTPATIENT
Start: 2025-04-28

## 2025-04-28 RX ORDER — DICYCLOMINE HYDROCHLORIDE 10 MG/1
CAPSULE ORAL
Qty: 90 CAPSULE | Refills: 11 | Status: SHIPPED | OUTPATIENT
Start: 2025-04-28

## 2025-04-28 NOTE — TELEPHONE ENCOUNTER
Prior authorization request received via fax for  Dicyclomine 10mg capsules    Form given to: PLACED IN [employer]OX ON [id number]

## 2025-05-01 LAB
LABORATORY COMMENT REPORT: NORMAL
PATH REPORT.FINAL DX SPEC: NORMAL
PATH REPORT.GROSS SPEC: NORMAL
PATH REPORT.RELEVANT HX SPEC: NORMAL
PATH REPORT.TOTAL CANCER: NORMAL

## 2025-05-27 ENCOUNTER — TELEPHONE (OUTPATIENT)
Dept: PRIMARY CARE | Facility: CLINIC | Age: 75
End: 2025-05-27
Payer: MEDICARE

## 2025-05-27 DIAGNOSIS — R19.00 ABDOMINAL SWELLING: Primary | ICD-10-CM

## 2025-05-27 NOTE — TELEPHONE ENCOUNTER
The kidneys are located in the back behind the ribs which doesn't match the location she is having pain in. Does anyone have any openings available to have her evaluated? Can you find out where she had the ultrasound done? I don't have those records.   Thanks,  Krys De Jesus, DO

## 2025-05-27 NOTE — TELEPHONE ENCOUNTER
Patient had colonoscopy 4/23/25 and started having pelvic pain afterwards so went to gynecologist and they did ultrasound ovary was swollen and whole stomach is swollen. (They don't believe the ovary is the cause for the pain)   Patient states looks 5 months pregnant. Pressure going to the restroom and stomach is hard. Gynecologist said she possibly would need a kidney ultrasound but would need to contact PCP for this.     Please advise.

## 2025-05-28 NOTE — TELEPHONE ENCOUNTER
Pt called back and gave Dr De Jesus's message.  Pt said she still is having pain.  Talked with pt's gynecologist office and sending the office ultrasound they did.   Please advise

## 2025-05-28 NOTE — TELEPHONE ENCOUNTER
I put in an order for abdominal ultrasound. Can you help patient schedule and forward message to Jennifer to have her add patient to her list of people that need to be seen soon? Thanks,  Krys De Jesus, DO

## 2025-05-29 ENCOUNTER — HOSPITAL ENCOUNTER (OUTPATIENT)
Dept: RADIOLOGY | Facility: CLINIC | Age: 75
Discharge: HOME | End: 2025-05-29
Payer: MEDICARE

## 2025-05-29 DIAGNOSIS — R19.00 ABDOMINAL SWELLING: ICD-10-CM

## 2025-05-29 PROCEDURE — 76700 US EXAM ABDOM COMPLETE: CPT | Performed by: RADIOLOGY

## 2025-05-29 PROCEDURE — 76700 US EXAM ABDOM COMPLETE: CPT

## 2025-06-01 ENCOUNTER — PATIENT MESSAGE (OUTPATIENT)
Dept: PRIMARY CARE | Facility: CLINIC | Age: 75
End: 2025-06-01
Payer: MEDICARE

## 2025-06-01 DIAGNOSIS — N20.0 RIGHT KIDNEY STONE: Primary | ICD-10-CM

## 2025-06-01 DIAGNOSIS — N13.30 HYDRONEPHROSIS OF RIGHT KIDNEY: ICD-10-CM

## 2025-06-02 ENCOUNTER — APPOINTMENT (OUTPATIENT)
Dept: RADIOLOGY | Facility: HOSPITAL | Age: 75
End: 2025-06-02
Payer: MEDICARE

## 2025-06-02 RX ORDER — TAMSULOSIN HYDROCHLORIDE 0.4 MG/1
0.4 CAPSULE ORAL DAILY
Qty: 10 CAPSULE | Refills: 0 | Status: SHIPPED | OUTPATIENT
Start: 2025-06-02 | End: 2025-06-12

## 2025-06-05 ENCOUNTER — HOSPITAL ENCOUNTER (OUTPATIENT)
Dept: RADIOLOGY | Facility: HOSPITAL | Age: 75
Discharge: HOME | End: 2025-06-05
Payer: MEDICARE

## 2025-06-05 ENCOUNTER — OFFICE VISIT (OUTPATIENT)
Dept: UROLOGY | Facility: CLINIC | Age: 75
End: 2025-06-05
Payer: MEDICARE

## 2025-06-05 DIAGNOSIS — N20.0 RIGHT KIDNEY STONE: ICD-10-CM

## 2025-06-05 DIAGNOSIS — N13.30 HYDRONEPHROSIS OF RIGHT KIDNEY: ICD-10-CM

## 2025-06-05 DIAGNOSIS — N20.0 KIDNEY STONE: ICD-10-CM

## 2025-06-05 LAB
POC BILIRUBIN, URINE: NEGATIVE
POC BLOOD, URINE: ABNORMAL
POC GLUCOSE, URINE: NEGATIVE MG/DL
POC KETONES, URINE: NEGATIVE MG/DL
POC LEUKOCYTES, URINE: NEGATIVE
POC NITRITE,URINE: NEGATIVE
POC PH, URINE: 7 PH
POC PROTEIN, URINE: NEGATIVE MG/DL
POC SPECIFIC GRAVITY, URINE: 1.02
POC UROBILINOGEN, URINE: 0.2 EU/DL

## 2025-06-05 PROCEDURE — 81003 URINALYSIS AUTO W/O SCOPE: CPT | Performed by: UROLOGY

## 2025-06-05 PROCEDURE — 1036F TOBACCO NON-USER: CPT | Performed by: UROLOGY

## 2025-06-05 PROCEDURE — 74176 CT ABD & PELVIS W/O CONTRAST: CPT

## 2025-06-05 PROCEDURE — 1159F MED LIST DOCD IN RCRD: CPT | Performed by: UROLOGY

## 2025-06-05 PROCEDURE — 99204 OFFICE O/P NEW MOD 45 MIN: CPT | Performed by: UROLOGY

## 2025-06-05 NOTE — PATIENT INSTRUCTIONS
Impression  Abdominal pain  Left distal ureteral stone on the ultrasound    Plan  Stat CT abdomen pelvis without contrast for abdominal pain left ureteral stone on ultrasound  Call patient results  We will proceed ureteroscopy and holmium laser lithotripsy stent if there is a stone

## 2025-06-05 NOTE — PROGRESS NOTES
06/05/2025  74-year-old female presents a generalized lower abdominal pain left in the right after colonoscopy.  Mild discomfort now    Renal ultrasound demonstrated a left distal ureteral stone    We discussed lower abdominal pain, 8 mm distal ureteral stone on the left on the ultrasound  We discussed CT scan abdomen pelvis without contrast to confirm  We discussed possible ureteroscopy holmium laser lithotripsy  All the questions were answered, the patient expressed understanding and agreed to the plan.    Impression  Abdominal pain  Left distal ureteral stone on the ultrasound    Plan  Stat CT abdomen pelvis without contrast for abdominal pain left ureteral stone on ultrasound  Call patient results  We will proceed ureteroscopy and holmium laser lithotripsy stent if there is a stone    Chief Complaint   Patient presents with    Nephrolithiasis     Pt is here today for a left renal calculus referred by Dr De Jesus, she also said her right kidney is swollen. She states that she has a lot of pressure in her vaginal area.         Physical Exam     TODAYS LAB RESULTS:  === 05/29/25 ===    US ABDOMEN COMPLETE    - Impression -  8 mm distal left ureteral calculus at the ureterovesical junction  producing mild left hydronephrosis and hydroureter.    2.8 x 3.1 x 3.4 cm hyperechoic hepatic mass consistent with  hemangioma. This hemangioma is noted on prior CT study from  05/06/2021.    Normal gallbladder, bile ducts, spleen, abdominal aorta, and IVC as  well as pancreas.    Subcentimeter bilateral renal cysts.    MACRO:  Critical Finding:  See findings. Notification was initiated on  5/30/2025 at 8:36 pm by  Rayne Reyes.  (**-YCF-**) Instructions:    Signed by: Rayne Reyes 5/30/2025 8:37 PM  Dictation workstation:   [id number]    POCT UA Automated manually resulted  Order: [id number]   Status: Final result       Next appt: 07/05/2025 at 11:15 AM in Radiology (POR CT 1)       Dx: Kidney stone    Test Result Released: Yes (not  seen)    0 Result Notes      Component  Ref Range & Units 13:21   POC Glucose, Urine  NEGATIVE mg/dl NEGATIVE   POC Bilirubin, Urine  NEGATIVE NEGATIVE   POC Ketones, Urine  NEGATIVE mg/dl NEGATIVE   POC Specific Gravity, Urine  1.005 - 1.035 1.020   POC Blood, Urine  NEGATIVE SMALL (1+) Abnormal    POC PH, Urine  No Reference Range Established PH 7.0   POC Protein, Urine  NEGATIVE mg/dl NEGATIVE   POC Urobilinogen, Urine  0.2, 1.0 EU/DL 0.2   Poc Nitrite, Urine  NEGATIVE NEGATIVE   POC Leukocytes, Urine  NEGATIVE NEGATIVE             Specimen Collected: 06/05/25 13:21 Last Resulted: 06/05/25 13:21         ASSESSMENT&PLAN:      IMPRESSIONS:

## 2025-06-17 DIAGNOSIS — F41.1 GENERALIZED ANXIETY DISORDER: ICD-10-CM

## 2025-06-20 RX ORDER — SERTRALINE HYDROCHLORIDE 100 MG/1
100 TABLET, FILM COATED ORAL DAILY
Qty: 30 TABLET | Refills: 0 | Status: SHIPPED | OUTPATIENT
Start: 2025-06-20

## 2025-07-05 ENCOUNTER — HOSPITAL ENCOUNTER (OUTPATIENT)
Dept: RADIOLOGY | Facility: HOSPITAL | Age: 75
Discharge: HOME | End: 2025-07-05
Payer: MEDICARE

## 2025-07-05 DIAGNOSIS — Z13.6 SCREENING FOR HEART DISEASE: ICD-10-CM

## 2025-07-05 PROCEDURE — 75571 CT HRT W/O DYE W/CA TEST: CPT

## 2025-07-09 ENCOUNTER — TELEPHONE (OUTPATIENT)
Dept: PRIMARY CARE | Facility: CLINIC | Age: 75
End: 2025-07-09
Payer: MEDICARE

## 2025-07-09 NOTE — TELEPHONE ENCOUNTER
"----- Message from Krys De Jesus sent at 7/7/2025  4:12 PM EDT -----  CT cardiac score is zero which is the best score possible and put her in the \"low risk\" category for heart disease.   Thanks,  Krys De Jesus DO    ----- Message -----  From: Interface, Radiology Results In  Sent: 7/7/2025   8:54 AM EDT  To: Krys De Jesus DO    "

## 2025-07-17 ENCOUNTER — APPOINTMENT (OUTPATIENT)
Dept: PRIMARY CARE | Facility: CLINIC | Age: 75
End: 2025-07-17
Payer: MEDICARE

## 2025-07-17 VITALS
HEART RATE: 94 BPM | RESPIRATION RATE: 20 BRPM | DIASTOLIC BLOOD PRESSURE: 70 MMHG | BODY MASS INDEX: 19.93 KG/M2 | WEIGHT: 101.5 LBS | SYSTOLIC BLOOD PRESSURE: 112 MMHG | OXYGEN SATURATION: 98 % | HEIGHT: 60 IN | TEMPERATURE: 96.9 F

## 2025-07-17 DIAGNOSIS — Z00.00 ROUTINE GENERAL MEDICAL EXAMINATION AT HEALTH CARE FACILITY: Primary | ICD-10-CM

## 2025-07-17 DIAGNOSIS — F33.1 DEPRESSION, MAJOR, RECURRENT, MODERATE: Chronic | ICD-10-CM

## 2025-07-17 DIAGNOSIS — D64.9 ANEMIA, UNSPECIFIED TYPE: ICD-10-CM

## 2025-07-17 DIAGNOSIS — E78.2 HYPERCHOLESTEROLEMIA WITH HYPERTRIGLYCERIDEMIA: ICD-10-CM

## 2025-07-17 DIAGNOSIS — F41.1 GENERALIZED ANXIETY DISORDER: ICD-10-CM

## 2025-07-17 PROCEDURE — G2211 COMPLEX E/M VISIT ADD ON: HCPCS | Performed by: FAMILY MEDICINE

## 2025-07-17 PROCEDURE — 1170F FXNL STATUS ASSESSED: CPT | Performed by: FAMILY MEDICINE

## 2025-07-17 PROCEDURE — 1160F RVW MEDS BY RX/DR IN RCRD: CPT | Performed by: FAMILY MEDICINE

## 2025-07-17 PROCEDURE — G0439 PPPS, SUBSEQ VISIT: HCPCS | Performed by: FAMILY MEDICINE

## 2025-07-17 PROCEDURE — 1159F MED LIST DOCD IN RCRD: CPT | Performed by: FAMILY MEDICINE

## 2025-07-17 PROCEDURE — 1036F TOBACCO NON-USER: CPT | Performed by: FAMILY MEDICINE

## 2025-07-17 PROCEDURE — 3008F BODY MASS INDEX DOCD: CPT | Performed by: FAMILY MEDICINE

## 2025-07-17 PROCEDURE — 1126F AMNT PAIN NOTED NONE PRSNT: CPT | Performed by: FAMILY MEDICINE

## 2025-07-17 RX ORDER — SERTRALINE HYDROCHLORIDE 100 MG/1
100 TABLET, FILM COATED ORAL DAILY
Qty: 90 TABLET | Refills: 3 | Status: SHIPPED | OUTPATIENT
Start: 2025-07-17 | End: 2026-07-17

## 2025-07-17 RX ORDER — DEXTROMETHORPHAN HYDROBROMIDE, GUAIFENESIN 5; 100 MG/5ML; MG/5ML
650 LIQUID ORAL EVERY 8 HOURS PRN
COMMUNITY

## 2025-07-17 SDOH — ECONOMIC STABILITY: FOOD INSECURITY: WITHIN THE PAST 12 MONTHS, YOU WORRIED THAT YOUR FOOD WOULD RUN OUT BEFORE YOU GOT MONEY TO BUY MORE.: NEVER TRUE

## 2025-07-17 SDOH — ECONOMIC STABILITY: FOOD INSECURITY: WITHIN THE PAST 12 MONTHS, THE FOOD YOU BOUGHT JUST DIDN'T LAST AND YOU DIDN'T HAVE MONEY TO GET MORE.: NEVER TRUE

## 2025-07-17 ASSESSMENT — PAIN SCALES - GENERAL: PAINLEVEL_OUTOF10: 0-NO PAIN

## 2025-07-17 ASSESSMENT — ENCOUNTER SYMPTOMS
LOSS OF SENSATION IN FEET: 0
DEPRESSION: 0
OCCASIONAL FEELINGS OF UNSTEADINESS: 0

## 2025-07-17 ASSESSMENT — LIFESTYLE VARIABLES
HOW MANY STANDARD DRINKS CONTAINING ALCOHOL DO YOU HAVE ON A TYPICAL DAY: PATIENT DOES NOT DRINK
HOW OFTEN DO YOU HAVE SIX OR MORE DRINKS ON ONE OCCASION: NEVER
AUDIT-C TOTAL SCORE: 0
HOW OFTEN DO YOU HAVE A DRINK CONTAINING ALCOHOL: NEVER
SKIP TO QUESTIONS 9-10: 1

## 2025-07-17 ASSESSMENT — ACTIVITIES OF DAILY LIVING (ADL)
BATHING: INDEPENDENT
MANAGING_FINANCES: INDEPENDENT
GROCERY_SHOPPING: INDEPENDENT
DRESSING: INDEPENDENT
TAKING_MEDICATION: INDEPENDENT
DOING_HOUSEWORK: INDEPENDENT

## 2025-07-17 NOTE — ASSESSMENT & PLAN NOTE
Stable  Continue Zoloft   Orders:    sertraline (Zoloft) 100 mg tablet; Take 1 tablet (100 mg) by mouth once daily.

## 2025-07-17 NOTE — PROGRESS NOTES
Subjective   Reason for Visit: Lenore Paredes is an 74 y.o. female here for a Medicare Wellness visit.     Past Medical, Surgical, and Family History reviewed and updated in chart.    Reviewed all medications by prescribing practitioner or clinical pharmacist (such as prescriptions, OTCs, herbal therapies and supplements) and documented in the medical record.    Migraine (Been getting migraines more frequently with pressure change in the weather.  - 2 months)        Patient Care Team:  Krys De Jesus DO as PCP - General  Krys eD Jesus DO as PCP - [employer] PCP  Alexandra Ponce PA-C as Physician Assistant (Gastroenterology)  Parish Boyle MD as Consulting Physician (Obstetrics and Gynecology)  Parish Bonilla MD as Consulting Physician (Urology)  Brittnee Gonzáles MD as Consulting Physician (Obstetrics and Gynecology)       Objective   Vitals:  /70 (BP Location: Right arm, Patient Position: Sitting, BP Cuff Size: Adult)   Pulse 94   Temp 36.1 °C (96.9 °F) (Temporal)   Resp 20   Ht (!) 1.524 m (5')   Wt 46 kg (101 lb 8 oz)   SpO2 98%   BMI 19.82 kg/m²       Physical Exam  Constitutional: Well nourished, well developed, appears stated age  Eyes: no scleral icterus, no conjunctival injection  Ears: normal external auditory canal, no retraction or bulging of tympanic membranes  Neck: no thyromegaly  Cardiovascular: regular rate and rhythm, no leg edema  Respiratory: normal respiratory effort, clear to auscultation bilaterally  Musculoskeletal: No gross deformities appreciated  Skin: Warm, dry. No rashes  Neurologic: Alert, CNs II-XII grossly intact..  Psych: Appropriate mood and affect.    Assessment & Plan  Routine general medical examination at health care facility  Colonoscopy done 4/2025  Mammogram 1/2025       Generalized anxiety disorder  Stable continue Zoloft   Orders:    sertraline (Zoloft) 100 mg tablet; Take 1 tablet (100 mg) by mouth once daily.    Hypercholesterolemia  with hypertriglyceridemia  Continue atorvastatin  Orders:    Comprehensive Metabolic Panel; Future    Lipid Panel; Future    Anemia, unspecified type    Orders:    CBC; Future    Vitamin B12; Future    Depression, major, recurrent, moderate  Stable  Continue Zoloft   Orders:    sertraline (Zoloft) 100 mg tablet; Take 1 tablet (100 mg) by mouth once daily.         Follow up 6 months.   Krys De Jesus,

## 2025-07-17 NOTE — PATIENT INSTRUCTIONS
Thank you for choosing [facility] for your healthcare.   As always if you have any questions or concerns please do not hesitate to call our office at [phone] or through [email or web address].    Have a great day!  Krys De Jesus, DO

## 2025-08-06 DIAGNOSIS — K12.0 CANKER SORE: ICD-10-CM

## 2025-08-06 RX ORDER — DEXAMETHASONE 0.5 MG/5ML
ELIXIR ORAL
Qty: 237 ML | Refills: 0 | Status: SHIPPED | OUTPATIENT
Start: 2025-08-06

## 2025-08-08 ENCOUNTER — TELEPHONE (OUTPATIENT)
Dept: PRIMARY CARE | Facility: CLINIC | Age: 75
End: 2025-08-08
Payer: MEDICARE

## 2026-01-29 ENCOUNTER — APPOINTMENT (OUTPATIENT)
Dept: PRIMARY CARE | Facility: CLINIC | Age: 76
End: 2026-01-29
Payer: MEDICARE